# Patient Record
Sex: FEMALE | Employment: STUDENT | URBAN - METROPOLITAN AREA
[De-identification: names, ages, dates, MRNs, and addresses within clinical notes are randomized per-mention and may not be internally consistent; named-entity substitution may affect disease eponyms.]

---

## 2024-01-04 ENCOUNTER — HOSPITAL ENCOUNTER (EMERGENCY)
Facility: HOSPITAL | Age: 24
Discharge: HOME | End: 2024-01-04
Attending: EMERGENCY MEDICINE
Payer: COMMERCIAL

## 2024-01-04 VITALS
DIASTOLIC BLOOD PRESSURE: 81 MMHG | SYSTOLIC BLOOD PRESSURE: 120 MMHG | RESPIRATION RATE: 16 BRPM | HEIGHT: 63 IN | WEIGHT: 160 LBS | HEART RATE: 105 BPM | OXYGEN SATURATION: 99 % | BODY MASS INDEX: 28.35 KG/M2 | TEMPERATURE: 98.9 F
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
## 2024-01-04 PROCEDURE — 99281 EMR DPT VST MAYX REQ PHY/QHP: CPT | Performed by: EMERGENCY MEDICINE

## 2024-01-04 PROCEDURE — 99283 EMERGENCY DEPT VISIT LOW MDM: CPT | Performed by: EMERGENCY MEDICINE

## 2024-01-04 ASSESSMENT — COLUMBIA-SUICIDE SEVERITY RATING SCALE - C-SSRS
1. IN THE PAST MONTH, HAVE YOU WISHED YOU WERE DEAD OR WISHED YOU COULD GO TO SLEEP AND NOT WAKE UP?: NO
6. HAVE YOU EVER DONE ANYTHING, STARTED TO DO ANYTHING, OR PREPARED TO DO ANYTHING TO END YOUR LIFE?: NO
2. HAVE YOU ACTUALLY HAD ANY THOUGHTS OF KILLING YOURSELF?: NO

## 2024-01-04 NOTE — ED TRIAGE NOTES
PT presents to ED via triage from work for chief complaint of needle stick. PT state she had a needle stick on the middle left finger after it was used on a PT. PT is AOX4 and ambulates on her own.

## 2024-01-04 NOTE — ED PROVIDER NOTES
CC: Body Fluid Exposure     HPI: Kylie Prabhakar is an 23 y.o. female with presenting to the emergency department for a needlestick.  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  Does not know if the patient was HIV positive.  Not having significant tenderness.    Limitations to History: None  Additional History Obtained from: Patient    PMHx/PSHx:  Per HPI.   - has no past medical history on file.  - has no past surgical history on file.    Social History:  - Tobacco:  reports that she has never smoked. She has never used smokeless tobacco.   - Alcohol:  has no history on file for alcohol use.   - Drugs:  has no history on file for drug use.     Medications: Reviewed in EMR.     Allergies:  Amoxicillin, Azithromycin, Codeine, and Penicillins    ???????????????????????????????????????????????????????????????  Triage Vitals:  T 37.2 °C (98.9 °F)    /81  RR 16  O2 99 % None (Room air)    Physical Exam  Constitutional:       General: She is not in acute distress.  HENT:      Head: Normocephalic.   Cardiovascular:      Rate and Rhythm: Tachycardia present.   Pulmonary:      Effort: Pulmonary effort is normal. No respiratory distress.   Abdominal:      General: Abdomen is flat.   Musculoskeletal:         General: Normal range of motion.   Skin:     General: Skin is dry.      Comments: Cut visualized and hemostatic   Neurological:      Mental Status: She is alert and oriented to person, place, and time. Mental status is at baseline.       ???????????????????????????????????????????????????????????????      ED Course/Medical Decision Making:    Diagnoses as of 01/05/24 1754   Exposure to needle, initial encounter        Patient is a 23-year-old female presenting for needlestick.  Patient does not know if she the patient was HIV positive.  Paperwork was filled out for [employer].  Needlestick is hemostasis static no concern for neurovascular injury.  Patient was  advised to have the patient from the needlestick tested for HIV hep B and hep C.  She was advised to follow-up in the morning with employee health and to bring documentation to them.  She expressed understanding of this. Patient was discharged home in stable condition. Patient was advised to return if symptoms worsen or don’t improve. Patient was advised to follow up with their PCP as needed.       External records reviewed: recent inpatient, clinic, and prior ED notes  Diagnostic imaging independently reviewed/interpreted by me (as reflected in MDM) includes: none  Social Determinants Affecting Care: None identified  Discussion of management with other providers: ed attending  Prescription Drug Consideration: HIV post exposure ppx was considered but HIV status unknown  Escalation of Care:  none    Impression:   needlestick    Disposition: discharge      Procedures ? Jackbox Gamess last updated 1/4/2024 4:24 AM        Marija Weaver MD  Resident  01/05/24 9810